# Patient Record
Sex: MALE | Race: BLACK OR AFRICAN AMERICAN | NOT HISPANIC OR LATINO | Employment: STUDENT | URBAN - METROPOLITAN AREA
[De-identification: names, ages, dates, MRNs, and addresses within clinical notes are randomized per-mention and may not be internally consistent; named-entity substitution may affect disease eponyms.]

---

## 2021-07-07 ENCOUNTER — IMMUNIZATION (OUTPATIENT)
Dept: FAMILY MEDICINE | Facility: CLINIC | Age: 21
End: 2021-07-07
Payer: COMMERCIAL

## 2021-07-07 DIAGNOSIS — Z23 NEED FOR VACCINATION: Primary | ICD-10-CM

## 2021-07-07 PROCEDURE — 91300 COVID-19, MRNA, LNP-S, PF, 30 MCG/0.3 ML DOSE VACCINE: CPT | Mod: PBBFAC | Performed by: FAMILY MEDICINE

## 2021-08-02 ENCOUNTER — IMMUNIZATION (OUTPATIENT)
Dept: FAMILY MEDICINE | Facility: CLINIC | Age: 21
End: 2021-08-02

## 2021-08-02 DIAGNOSIS — Z23 NEED FOR VACCINATION: Primary | ICD-10-CM

## 2021-08-02 PROCEDURE — 91300 COVID-19, MRNA, LNP-S, PF, 30 MCG/0.3 ML DOSE VACCINE: CPT | Mod: ,,, | Performed by: FAMILY MEDICINE

## 2021-08-02 PROCEDURE — 91300 COVID-19, MRNA, LNP-S, PF, 30 MCG/0.3 ML DOSE VACCINE: ICD-10-PCS | Mod: ,,, | Performed by: FAMILY MEDICINE

## 2021-08-02 PROCEDURE — 0002A COVID-19, MRNA, LNP-S, PF, 30 MCG/0.3 ML DOSE VACCINE: ICD-10-PCS | Mod: CV19,,, | Performed by: FAMILY MEDICINE

## 2021-08-02 PROCEDURE — 0002A COVID-19, MRNA, LNP-S, PF, 30 MCG/0.3 ML DOSE VACCINE: CPT | Mod: CV19,,, | Performed by: FAMILY MEDICINE

## 2024-07-01 ENCOUNTER — LAB VISIT (OUTPATIENT)
Dept: LAB | Facility: HOSPITAL | Age: 24
End: 2024-07-01
Attending: STUDENT IN AN ORGANIZED HEALTH CARE EDUCATION/TRAINING PROGRAM
Payer: COMMERCIAL

## 2024-07-01 ENCOUNTER — OFFICE VISIT (OUTPATIENT)
Dept: FAMILY MEDICINE | Facility: CLINIC | Age: 24
End: 2024-07-01
Payer: COMMERCIAL

## 2024-07-01 VITALS
OXYGEN SATURATION: 99 % | HEART RATE: 78 BPM | DIASTOLIC BLOOD PRESSURE: 72 MMHG | WEIGHT: 212.94 LBS | HEIGHT: 75 IN | SYSTOLIC BLOOD PRESSURE: 136 MMHG | BODY MASS INDEX: 26.48 KG/M2

## 2024-07-01 DIAGNOSIS — R79.89 ELEVATED SERUM CREATININE: ICD-10-CM

## 2024-07-01 DIAGNOSIS — Z11.4 SCREENING FOR HIV (HUMAN IMMUNODEFICIENCY VIRUS): ICD-10-CM

## 2024-07-01 DIAGNOSIS — Z11.59 ENCOUNTER FOR HEPATITIS C SCREENING TEST FOR LOW RISK PATIENT: ICD-10-CM

## 2024-07-01 DIAGNOSIS — Z00.00 WELLNESS EXAMINATION: ICD-10-CM

## 2024-07-01 DIAGNOSIS — R79.89 LOW TESTOSTERONE IN MALE: ICD-10-CM

## 2024-07-01 DIAGNOSIS — Z76.89 ENCOUNTER TO ESTABLISH CARE WITH NEW DOCTOR: Primary | ICD-10-CM

## 2024-07-01 DIAGNOSIS — Z11.3 SCREEN FOR STD (SEXUALLY TRANSMITTED DISEASE): ICD-10-CM

## 2024-07-01 LAB
ANION GAP SERPL CALC-SCNC: 5 MMOL/L (ref 8–16)
BUN SERPL-MCNC: 20 MG/DL (ref 6–20)
CALCIUM SERPL-MCNC: 9.6 MG/DL (ref 8.7–10.5)
CHLORIDE SERPL-SCNC: 105 MMOL/L (ref 95–110)
CO2 SERPL-SCNC: 26 MMOL/L (ref 23–29)
CREAT SERPL-MCNC: 1.6 MG/DL (ref 0.5–1.4)
EST. GFR  (NO RACE VARIABLE): >60 ML/MIN/1.73 M^2
GLUCOSE SERPL-MCNC: 95 MG/DL (ref 70–110)
HCV AB SERPL QL IA: NORMAL
HIV 1+2 AB+HIV1 P24 AG SERPL QL IA: NORMAL
POTASSIUM SERPL-SCNC: 4.2 MMOL/L (ref 3.5–5.1)
SODIUM SERPL-SCNC: 136 MMOL/L (ref 136–145)
TREPONEMA PALLIDUM IGG+IGM AB [PRESENCE] IN SERUM OR PLASMA BY IMMUNOASSAY: NONREACTIVE

## 2024-07-01 PROCEDURE — 86593 SYPHILIS TEST NON-TREP QUANT: CPT | Performed by: STUDENT IN AN ORGANIZED HEALTH CARE EDUCATION/TRAINING PROGRAM

## 2024-07-01 PROCEDURE — 36415 COLL VENOUS BLD VENIPUNCTURE: CPT | Mod: PO | Performed by: STUDENT IN AN ORGANIZED HEALTH CARE EDUCATION/TRAINING PROGRAM

## 2024-07-01 PROCEDURE — 1159F MED LIST DOCD IN RCRD: CPT | Mod: CPTII,S$GLB,, | Performed by: STUDENT IN AN ORGANIZED HEALTH CARE EDUCATION/TRAINING PROGRAM

## 2024-07-01 PROCEDURE — 3078F DIAST BP <80 MM HG: CPT | Mod: CPTII,S$GLB,, | Performed by: STUDENT IN AN ORGANIZED HEALTH CARE EDUCATION/TRAINING PROGRAM

## 2024-07-01 PROCEDURE — 99999 PR PBB SHADOW E&M-EST. PATIENT-LVL III: CPT | Mod: PBBFAC,,, | Performed by: STUDENT IN AN ORGANIZED HEALTH CARE EDUCATION/TRAINING PROGRAM

## 2024-07-01 PROCEDURE — 80048 BASIC METABOLIC PNL TOTAL CA: CPT | Performed by: STUDENT IN AN ORGANIZED HEALTH CARE EDUCATION/TRAINING PROGRAM

## 2024-07-01 PROCEDURE — 3075F SYST BP GE 130 - 139MM HG: CPT | Mod: CPTII,S$GLB,, | Performed by: STUDENT IN AN ORGANIZED HEALTH CARE EDUCATION/TRAINING PROGRAM

## 2024-07-01 PROCEDURE — 86803 HEPATITIS C AB TEST: CPT | Performed by: STUDENT IN AN ORGANIZED HEALTH CARE EDUCATION/TRAINING PROGRAM

## 2024-07-01 PROCEDURE — 87389 HIV-1 AG W/HIV-1&-2 AB AG IA: CPT | Performed by: STUDENT IN AN ORGANIZED HEALTH CARE EDUCATION/TRAINING PROGRAM

## 2024-07-01 PROCEDURE — 99385 PREV VISIT NEW AGE 18-39: CPT | Mod: S$GLB,,, | Performed by: STUDENT IN AN ORGANIZED HEALTH CARE EDUCATION/TRAINING PROGRAM

## 2024-07-01 PROCEDURE — 3008F BODY MASS INDEX DOCD: CPT | Mod: CPTII,S$GLB,, | Performed by: STUDENT IN AN ORGANIZED HEALTH CARE EDUCATION/TRAINING PROGRAM

## 2024-07-01 RX ORDER — ANASTROZOLE 1 MG/1
1 TABLET ORAL
COMMUNITY
Start: 2024-05-13

## 2024-07-01 NOTE — PROGRESS NOTES
Subjective:       Patient ID: Maikel Balderas is a 23 y.o. male.    Chief Complaint: Establish Care    22-year-old male presents to establish care.  He has been diagnosed with low testosterone in follows with endocrinology in [place], Dr. Chandu Lopez.  The patient thinks his testosterone got low because of poor diet with process foods in college.  He eats clean now without dietary restrictions whereas before he ate in the cafeteria.  He was in [facility] for the last 3 weeks on vacation.  He is currently finishing his IESHA.  He played football in college and takes 5 g of creatinine a day.  He drinks a lot of water.  His sleep is poor because he has a tendency to feel anxious and everything.  Sometimes he wakes up and can not go back to sleep.  He might get 6-8 hours of bed at night because he makes sure to get a better early.  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  Patient definitely meets a minimum of 150 minutes of moderate and or 75 minutes of vigorous physical activity a week.  He denies chest pain, shortness of breath, or passing out.  Patient is amenable to screening labs for wellness. Return to clinic in one year or sooner if needed.  ----  On review of prior lab work in November 2017 his creatinine was 1 at an, considered normal, but in January it was mildly elevated at 1.51, and again elevated in May at 1.47.    Screening labs show elevated creatinine at 1.6 with BUN of 20, a ratio less than.  I reached out to the patient via [facility] who agrees with referral to Nephrology for SABINA.  He agrees discontinue use of creatine until he can establish with Nephrology.  Otherwise once in a lifetime screening for hepatitis-C and HIV are non-reactive.  Screening for STDs is negative for chlamydia and gonorrhea and nonreactive for syphilis.        Past Medical History:   Diagnosis Date    Low testosterone in male 02/2024       No past surgical history on file.    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     Comment: wants STD testing     Social Determinants of Health     Financial Resource Strain: Low Risk  (6/28/2024)    Overall Financial Resource Strain (CARDIA)     Difficulty of Paying Living Expenses: Not hard at all   Food Insecurity: No Food Insecurity (6/28/2024)    Hunger Vital Sign     Worried About Running Out of Food in the Last Year: Never true     Ran Out of Food in the Last Year: Never true   Physical Activity: Sufficiently Active (6/28/2024)    Exercise Vital Sign     Days of Exercise per Week: 7 days     Minutes of Exercise per Session: 150+ min   Stress: Stress Concern Present (6/28/2024)    [employer] [place] [employer] - Occupational Stress Questionnaire     Feeling of Stress : To some extent   Housing Stability: Unknown (6/28/2024)    Housing Stability Vital Sign     Unable to Pay for Housing in the Last Year: No       Current Outpatient Medications on File Prior to Visit   Medication Sig Dispense Refill    anastrozole (ARIMIDEX) 1 mg Tab Take 1 mg by mouth Every 3 (three) days.      GONADORELIN ACETATE INJ Inject 0.3 mg as directed every other day. For low testosterone       No current facility-administered medications on file prior to visit.       Family History   Problem Relation Name Age of Onset    Asthma Sister      Coronary artery disease Maternal Grandmother  50    Stroke Maternal Grandmother      Hypertension Maternal Grandmother      Coronary artery  "disease Maternal Grandfather  50    Stroke Maternal Grandfather      Hypertension Maternal Grandfather         Review of Systems    Objective:      /72   Pulse 78   Ht 6' 3" (1.905 m)   Wt 96.6 kg (212 lb 15.4 oz)   SpO2 99%   BMI 26.62 kg/m²   Physical Exam  HENT:      Mouth/Throat:      Mouth: Mucous membranes are moist.   Eyes:      Pupils: Pupils are equal, round, and reactive to light.   Cardiovascular:      Rate and Rhythm: Normal rate and regular rhythm.      Pulses: Normal pulses.      Heart sounds: Normal heart sounds. No murmur heard.     No friction rub. No gallop.   Abdominal:      General: Bowel sounds are normal.      Palpations: Abdomen is soft. There is no mass.      Tenderness: There is no abdominal tenderness.   Musculoskeletal:      Right lower leg: No edema.      Left lower leg: No edema.   Lymphadenopathy:      Cervical: No cervical adenopathy.   Skin:     General: Skin is warm and dry.   Neurological:      General: No focal deficit present.      Mental Status: He is alert.   Psychiatric:         Mood and Affect: Mood normal.         Behavior: Behavior normal.         Assessment:       1. Encounter to establish care with new doctor    2. Wellness examination    3. Elevated serum creatinine    4. Screening for HIV (human immunodeficiency virus)    5. Encounter for hepatitis C screening test for low risk patient    6. Screen for STD (sexually transmitted disease)        Plan:       Encounter to establish care with new doctor    Wellness examination    Elevated serum creatinine  -     BASIC METABOLIC PANEL; Future; Expected date: 07/01/2024    Screening for HIV (human immunodeficiency virus)  -     HIV 1/2 Ag/Ab (4th Gen); Future; Expected date: 07/01/2024    Encounter for hepatitis C screening test for low risk patient  -     HEPATITIS C ANTIBODY; Future; Expected date: 07/01/2024    Screen for STD (sexually transmitted disease)  -     HIV 1/2 Ag/Ab (4th Gen); Future; Expected date: " 07/01/2024  -     HEPATITIS C ANTIBODY; Future; Expected date: 07/01/2024  -     C. trachomatis/N. gonorrhoeae by AMP DNA [facility]; Urine; Future; Expected date: 07/01/2024  -     Treponema Pallidium Antibodies IgG, IgM; Future; Expected date: 07/01/2024        Counseled on regular exercise, maintenance of a healthy weight, balanced diet rich in fruits/vegetables and lean protein, and avoidance of unhealthy habits like smoking and excessive alcohol intake.    Return to clinic in one year or sooner if needed.  -----------------------  Patient is referred to Nephrology for SABINA and agrees to discontinue creatine and maintain oral hydration until he can establish with them.

## 2024-07-02 ENCOUNTER — PATIENT MESSAGE (OUTPATIENT)
Dept: FAMILY MEDICINE | Facility: CLINIC | Age: 24
End: 2024-07-02
Payer: COMMERCIAL

## 2024-07-02 DIAGNOSIS — R79.89 ELEVATED SERUM CREATININE: Primary | ICD-10-CM

## 2024-07-10 PROBLEM — R79.89 LOW TESTOSTERONE IN MALE: Status: ACTIVE | Noted: 2024-07-10

## 2024-07-10 PROBLEM — R79.89 ELEVATED SERUM CREATININE: Status: ACTIVE | Noted: 2024-07-10

## 2025-02-10 ENCOUNTER — LAB VISIT (OUTPATIENT)
Dept: LAB | Facility: HOSPITAL | Age: 25
End: 2025-02-10
Payer: COMMERCIAL

## 2025-02-10 ENCOUNTER — OFFICE VISIT (OUTPATIENT)
Dept: FAMILY MEDICINE | Facility: CLINIC | Age: 25
End: 2025-02-10
Payer: COMMERCIAL

## 2025-02-10 VITALS
SYSTOLIC BLOOD PRESSURE: 136 MMHG | HEART RATE: 61 BPM | WEIGHT: 222.44 LBS | OXYGEN SATURATION: 100 % | DIASTOLIC BLOOD PRESSURE: 78 MMHG | BODY MASS INDEX: 27.8 KG/M2

## 2025-02-10 DIAGNOSIS — R79.89 LOW TESTOSTERONE IN MALE: ICD-10-CM

## 2025-02-10 DIAGNOSIS — R30.0 DYSURIA: ICD-10-CM

## 2025-02-10 DIAGNOSIS — R30.0 DYSURIA: Primary | ICD-10-CM

## 2025-02-10 LAB
BILIRUB UR QL STRIP: NEGATIVE
CLARITY UR: CLEAR
COLOR UR: YELLOW
GLUCOSE UR QL STRIP: NEGATIVE
HGB UR QL STRIP: NEGATIVE
KETONES UR QL STRIP: NEGATIVE
LEUKOCYTE ESTERASE UR QL STRIP: NEGATIVE
NITRITE UR QL STRIP: NEGATIVE
PH UR STRIP: 6 [PH] (ref 5–8)
PROT UR QL STRIP: NEGATIVE
SP GR UR STRIP: <=1.005 (ref 1–1.03)
URN SPEC COLLECT METH UR: ABNORMAL

## 2025-02-10 PROCEDURE — 81003 URINALYSIS AUTO W/O SCOPE: CPT | Mod: PO | Performed by: STUDENT IN AN ORGANIZED HEALTH CARE EDUCATION/TRAINING PROGRAM

## 2025-02-10 RX ORDER — CLOMIPHENE CITRATE 50 MG/1
TABLET ORAL
COMMUNITY
Start: 2025-01-13

## 2025-02-11 NOTE — PROGRESS NOTES
Subjective:       Patient ID: Maikel Balderas is a 24 y.o. male who presents for acute concern. The last visit here was 7/1/24 with me to establish care.    Chief Complaint: Annual Exam    History of Present Illness    CHIEF COMPLAINT:  Patient presents today for painful urination and follow up on elevated creatinine.    URINARY SYMPTOMS:  He reports pressure and painful urination, particularly when holding the bladder for extended periods. The sensation is described as more bladder pressure rather than acute pain during urination. Urination frequency is approximately once per hour. He typically does not urinate at night until morning, with occasional nighttime urination after large water intake. He reports normal urine volume with complete bladder emptying after urination. He denies changes in urine color, unusual odors, abdominal pain, nausea, fever, sweats, or chills.    RENAL:  Creatinine was elevated at 1.6 mg/dL in July. He discontinued creatine supplementation from July through December, at which point supplementation was resumed.    MEDICATIONS:  He currently takes Anastrozole and Clomid.    ALLERGIES:  He reports allergic rhinitis.         Past Medical History:   Diagnosis Date    Low testosterone in male 02/2024       History reviewed. No pertinent surgical history.    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     Comment: wants STD testing     Social [place] [employer] (1BO[employer]) of Health      Financial Resource Strain: Low Risk  (2/19/2025)    Overall Financial Resource Strain (CARDIA)     Difficulty of Paying Living Expenses: Not very hard   Food Insecurity: Food Insecurity Present (2/19/2025)    Hunger Vital Sign     Worried About Running Out of Food in the Last Year: Sometimes true     Ran Out of Food in the Last Year: Sometimes true   Transportation Needs: No Transportation Needs (2/19/2025)    PRAPARE - Transportation     Lack of Transportation (Medical): No     Lack of Transportation (Non-Medical): No   Physical Activity: Sufficiently Active (2/19/2025)    Exercise Vital Sign     Days of Exercise per Week: 6 days     Minutes of Exercise per Session: 150+ min   Stress: Stress Concern Present (2/19/2025)    [employer] [employer]ational [employer] - Occupational Stress Questionnaire     Feeling of Stress : To some extent   Housing Stability: Low Risk  (2/19/2025)    Housing Stability Vital Sign     Unable to Pay for Housing in the Last Year: No     Number of Times Moved in the Last Year: 0     Homeless in the Last Year: No       Current Outpatient Medications on File Prior to Visit   Medication Sig Dispense Refill    CLOMID 50 mg tablet Take by mouth.      anastrozole (ARIMIDEX) 1 mg Tab Take 1 mg by mouth Every 3 (three) days. (Patient not taking: Reported on 2/10/2025.)       No current facility-administered medications on file prior to visit.       Family History   Problem Relation Name Age of Onset    Asthma Sister      Coronary artery disease Maternal Grandmother  50    Stroke Maternal Grandmother      Hypertension Maternal Grandmother      Coronary artery disease Maternal Grandfather  50    Stroke Maternal Grandfather      Hypertension Maternal Grandfather         Review of Systems   Constitutional:  Negative for chills.   Gastrointestinal:  Negative for constipation, nausea and vomiting.   Genitourinary:  Positive for dysuria. Negative for flank pain, frequency, hematuria and urgency.    Integumentary:  Negative for rash.       Objective:      /78   Pulse 61   Wt 100.9 kg (222 lb 7.1 oz)   SpO2 100%   BMI 27.80 kg/m²   Physical Exam  HENT:      Mouth/Throat:      Mouth: Mucous membranes are moist.      Pharynx: Oropharynx is clear.   Eyes:      Conjunctiva/sclera: Conjunctivae normal.   Cardiovascular:      Rate and Rhythm: Normal rate and regular rhythm.      Heart sounds: Normal heart sounds.   Pulmonary:      Effort: Pulmonary effort is normal.      Breath sounds: Normal breath sounds.   Abdominal:      General: Bowel sounds are normal.      Palpations: Abdomen is soft. There is no mass.      Tenderness: There is no abdominal tenderness.   Musculoskeletal:      Right lower leg: No edema.      Left lower leg: No edema.   Skin:     General: Skin is warm and dry.   Neurological:      General: No focal deficit present.      Mental Status: He is alert.   Psychiatric:         Mood and Affect: Mood normal.         Behavior: Behavior normal.         Assessment:       Assessment & Plan      IMPRESSION:  - Assessed urinary symptoms, including pressure and occasional discomfort, likely due to prolonged bladder holding  - Considered possibility of urinary tract infection, though symptoms appear reassuring  - Noted discontinuation of creatine supplements following previous elevated creatinine levels  - Reviewed current hormone therapy regimen, including Anastrozole and Clomid  - Acknowledged planned transition to [facility] for ongoing hormone management    R39.15 URGENCY OF URINATION:  - Assessed the patient's urinary symptoms, including pressure in bladder and occasional painful urination, especially after holding urine for extended periods.  - Noted that the patient reports urinating approximately once every hour, which is normal for them.  - Explained that prolonged bladder holding can cause urinary discomfort and pressure symptoms.  - Advised the patient to avoid holding urine  for extended periods and recommended timely micturition when feeling the urge to void.  - Ordered urinalysis to rule out urinary tract infection.  - Will communicate results of urinalysis through the patient portal.    R39.81 FUNCTIONAL URINARY INCONTINENCE:  - Noted that the patient reports normal urination frequency and volume.    Z79.891 LONG TERM (CURRENT) USE OF OPIATE ANALGESIC:  - Documented the patient's current use of Anastrozole and Clomiphene.  - Acknowledged the patient's plan to initiate care with a new men's health clinic.  - Recommend follow up in 6 months for a comprehensive checkup.           Plan:       Dysuria  -     Urinalysis, Reflex to Urine Culture Urine, Clean Catch; Future; Expected date: 02/10/2025  - Urinalysis was reassuring without signs of infection.    Low testosterone in male  - Continue to establish with new clinic as scheduled.        Follow up in about 6 months (around 8/10/2025).    This note was generated with the assistance of ambient listening technology. Verbal consent was obtained by the patient and accompanying visitor(s) for the recording of patient appointment to facilitate this note. I attest to having reviewed and edited the generated note for accuracy, though some syntax or spelling errors may persist. Please contact the author of this note for any clarification.      Answers submitted by the patient for this visit:  Painful Urination Questionnaire (Submitted on 2/10/2025)  Chief Complaint: Dysuria  Chronicity: new  Onset: in the past 7 days  Frequency: intermittently  Progression since onset: unchanged  Pain quality: aching  Pain - numeric: 2/10  Fever: no fever  Sexually active?: Yes  History of pyelonephritis?: Yes  discharge: No  hesitancy: No  possible pregnancy: No  sweats: No  weight loss: No  withholding: No  behavior changes: No  Treatments tried: nothing  Improvement on treatment: mild  Pain severity: mild  catheterization: No  diabetes insipidus: No  diabetes  mellitus: No  genitourinary reflux: No  hypertension: Yes  recurrent UTIs: No  single kidney: No  STD: No  urinary stasis: No  urological procedure: No  kidney stones: No

## 2025-02-19 ENCOUNTER — TELEPHONE (OUTPATIENT)
Dept: FAMILY MEDICINE | Facility: CLINIC | Age: 25
End: 2025-02-19

## 2025-02-19 ENCOUNTER — OFFICE VISIT (OUTPATIENT)
Dept: FAMILY MEDICINE | Facility: CLINIC | Age: 25
End: 2025-02-19
Payer: COMMERCIAL

## 2025-02-19 ENCOUNTER — PATIENT MESSAGE (OUTPATIENT)
Dept: FAMILY MEDICINE | Facility: CLINIC | Age: 25
End: 2025-02-19
Payer: COMMERCIAL

## 2025-02-19 DIAGNOSIS — R30.0 DYSURIA: ICD-10-CM

## 2025-02-19 DIAGNOSIS — Z11.3 SCREENING FOR STDS (SEXUALLY TRANSMITTED DISEASES): ICD-10-CM

## 2025-02-19 DIAGNOSIS — A74.9 CHLAMYDIA INFECTION: Primary | ICD-10-CM

## 2025-02-19 PROCEDURE — 98005 SYNCH AUDIO-VIDEO EST LOW 20: CPT | Mod: 95,,, | Performed by: PHYSICIAN ASSISTANT

## 2025-02-19 NOTE — PROGRESS NOTES
Subjective:      Patient ID: Maikel Balderas is a 24 y.o. male.    Chief Complaint: Dysuria    Patient's active medical issues include low testosterone.    Patient reports dysuria for over 2 weeks.  Denies fever.    Monogamous with female partner (partner has had other partners and recently tested positive for chlamydia).      Dysuria   This is a recurrent problem. The current episode started 1 to 4 weeks ago. The problem occurs intermittently. The problem has been waxing and waning. The quality of the pain is described as burning. The pain is at a severity of 3/10. The pain is mild. There has been no fever. The fever has been present for Less than 1 day. He is Sexually active. There is No history of pyelonephritis. Pertinent negatives include no behavior changes, chills, discharge, flank pain, frequency, hematuria, hesitancy, nausea, possible pregnancy, sweats, urgency, vomiting, weight loss, constipation, rash or withholding. He has tried nothing for the symptoms. The treatment provided mild relief. His past medical history is significant for catheterization, diabetes insipidus, diabetes mellitus, genitourinary reflux, hypertension, kidney stones, recurrent UTIs, a single kidney, STD, urinary stasis and a urological procedure.     Review of Systems   Constitutional:  Negative for chills, fever and weight loss.   Respiratory:  Negative for shortness of breath.    Cardiovascular:  Negative for chest pain.   Gastrointestinal:  Negative for abdominal pain, constipation, nausea and vomiting.   Genitourinary:  Positive for dysuria. Negative for flank pain, frequency, genital sores, hematuria, hesitancy, penile discharge, penile pain, penile swelling, scrotal swelling, testicular pain and urgency.   Skin:  Negative for rash.       Objective:   There were no vitals taken for this visit.    Physical Exam  Constitutional:       Appearance: Normal appearance.   HENT:      Head: Normocephalic and atraumatic.      Right Ear:  External ear normal.      Left Ear: External ear normal.   Pulmonary:      Effort: No respiratory distress.   Neurological:      Mental Status: He is alert and oriented to person, place, and time.   Psychiatric:         Mood and Affect: Mood normal.         Behavior: Behavior normal.         Judgment: Judgment normal.       Assessment:      1. Chlamydia infection    2. Dysuria    3. Screening for STDs (sexually transmitted diseases)       Plan:   1. Chlamydia infection (Primary)  - doxycycline (MONODOX) 100 MG capsule; Take 1 capsule (100 mg total) by mouth 2 (two) times daily. for 7 days  Dispense: 14 capsule; Refill: 0    2. Dysuria  - C. trachomatis/N. gonorrhoeae by AMP DNA; Future  - HIV 1/2 Ag/Ab (4th Gen); Future  - Hepatitis Panel, Acute; Future  - Treponema Pallidium Antibodies IgG, IgM; Future    3. Screening for STDs (sexually transmitted diseases)  - C. trachomatis/N. gonorrhoeae by AMP DNA; Future  - HIV 1/2 Ag/Ab (4th Gen); Future    Follow up as needed.  The patient location is:  Patient Home   The chief complaint leading to consultation is: dysuria  Visit type: Virtual visit with synchronous audio and video  Total time spent with patient: 20 minutes  Each patient to whom he or she provides medical services by telemedicine is:  (1) informed of the relationship between the physician and patient and the respective role of any other health care provider with respect to management of the patient; and (2) notified that he or she may decline to receive medical services by telemedicine and may withdraw from such care at any time.

## 2025-02-19 NOTE — TELEPHONE ENCOUNTER
----- Message from Sarah sent at 2/19/2025  8:18 AM CST -----  Type:  Needs Medical AdviceWho Called: AROLDO KELLY [[phone]]Pharmacy name and phone #:  [id number] [place] - [id number] Phone: [phone]: [phone] the patient rather a call back or a response via [facility]? [facility] Best Call Back Number: [phone] Information: patient states he was mad know that he was given an infection by someone and would like to see if the providers office can put in orders for him or if the urinalysis he had done recently would show that. Patient states he would like to get this information and see what his next steps would be as soon as possible. Please message through [facility] portal with further assistance.   Fax states-plan does not cover this medication. Please call plan at [phone] to initiate prior authorization or call/fax pharmacy to change medication patient id#[id number]  Fax in Dr Claudio mailbox

## 2025-02-19 NOTE — TELEPHONE ENCOUNTER
"LOV: 2/10/25  "patient states he was mad know that he was given an infection by someone and would like to see if the providers office can put in orders for him or if the urinalysis he had done recently would show that. Patient states he would like to get this information and see what his next steps would be as soon as possible. Please message through [facility] portal with further assistance. "    Would you like us to contact Pt to set up OV, Please advise  "

## 2025-02-20 ENCOUNTER — LAB VISIT (OUTPATIENT)
Dept: LAB | Facility: HOSPITAL | Age: 25
End: 2025-02-20
Attending: PHYSICIAN ASSISTANT
Payer: COMMERCIAL

## 2025-02-20 DIAGNOSIS — R30.0 DYSURIA: ICD-10-CM

## 2025-02-20 LAB
HAV IGM SERPL QL IA: NORMAL
HBV CORE IGM SERPL QL IA: NORMAL
HBV SURFACE AG SERPL QL IA: NORMAL
HCV AB SERPL QL IA: NORMAL
HIV 1+2 AB+HIV1 P24 AG SERPL QL IA: NORMAL
TREPONEMA PALLIDUM IGG+IGM AB [PRESENCE] IN SERUM OR PLASMA BY IMMUNOASSAY: NONREACTIVE

## 2025-02-20 PROCEDURE — 80074 ACUTE HEPATITIS PANEL: CPT | Performed by: PHYSICIAN ASSISTANT

## 2025-02-20 PROCEDURE — 86593 SYPHILIS TEST NON-TREP QUANT: CPT | Performed by: PHYSICIAN ASSISTANT

## 2025-02-20 PROCEDURE — 87389 HIV-1 AG W/HIV-1&-2 AB AG IA: CPT | Performed by: PHYSICIAN ASSISTANT

## 2025-02-20 PROCEDURE — 36415 COLL VENOUS BLD VENIPUNCTURE: CPT | Mod: PO | Performed by: PHYSICIAN ASSISTANT

## 2025-02-21 ENCOUNTER — TELEPHONE (OUTPATIENT)
Dept: FAMILY MEDICINE | Facility: CLINIC | Age: 25
End: 2025-02-21
Payer: COMMERCIAL

## 2025-02-21 NOTE — TELEPHONE ENCOUNTER
Returned patient call. Patient was inquiring how long labs could take for resulting.   Advised it can be 3-5 business days per lab.

## 2025-02-21 NOTE — TELEPHONE ENCOUNTER
----- Message from [employer] sent at 2/21/2025  1:06 PM CST -----  Type:  Test ResultsWho Called:  ptsName of Test (Lab/Mammo/Etc):  UA/LABSDate of Test:  2/20Ordering Provider:  SteveWhere the test was performed:  Best Call Back Number:  [phone] Additional Information:  Pt states he would like to speak to staff in reference to labsPlease adviseThanks

## 2025-02-23 ENCOUNTER — RESULTS FOLLOW-UP (OUTPATIENT)
Dept: FAMILY MEDICINE | Facility: CLINIC | Age: 25
End: 2025-02-23

## 2025-02-23 RX ORDER — DOXYCYCLINE 100 MG/1
100 CAPSULE ORAL 2 TIMES DAILY
Qty: 14 CAPSULE | Refills: 0 | Status: SHIPPED | OUTPATIENT
Start: 2025-02-23 | End: 2025-02-25 | Stop reason: SDUPTHER

## 2025-02-25 DIAGNOSIS — A74.9 CHLAMYDIA INFECTION: ICD-10-CM

## 2025-02-25 RX ORDER — DOXYCYCLINE 100 MG/1
100 CAPSULE ORAL ONCE
Qty: 1 CAPSULE | Refills: 0 | Status: SHIPPED | OUTPATIENT
Start: 2025-02-25 | End: 2025-02-25

## 2025-02-25 NOTE — TELEPHONE ENCOUNTER
Pls advise.     Pt called stating that he threw up after taking doxycycline on day 2. I asked pt If he ate. Pt did not eat. I advised pt to eat since that is what Provider advised and could be the reason for him throwing up. Pt stated he was in a hurry and did not have time. I asked pt if he was having any other symptoms. Pt stated no he only threw up that one time when he did not eat and took rx. Pt was wondering if he can get a single capsule since the one he already took he threw up.

## 2025-02-25 NOTE — TELEPHONE ENCOUNTER
----- Message from Millie sent at 2/25/2025  4:00 PM CST -----  Type: Needs Medical AdviceWho Called:  pt Best Call Back Number: [phone] (home) Additional Information: pt requesting call back in regards to wanting an update on next steps following vomiting of medication

## 2025-05-30 ENCOUNTER — LAB VISIT (OUTPATIENT)
Dept: LAB | Facility: HOSPITAL | Age: 25
End: 2025-05-30
Payer: COMMERCIAL

## 2025-05-30 DIAGNOSIS — Z11.3 SCREENING FOR STDS (SEXUALLY TRANSMITTED DISEASES): ICD-10-CM

## 2025-05-30 PROCEDURE — 87491 CHLMYD TRACH DNA AMP PROBE: CPT

## 2025-05-31 LAB
C TRACH DNA SPEC QL NAA+PROBE: NOT DETECTED
CTGC SOURCE (OHS) ORD-325: NORMAL
N GONORRHOEA DNA UR QL NAA+PROBE: NOT DETECTED